# Patient Record
Sex: FEMALE | Race: WHITE | NOT HISPANIC OR LATINO
[De-identification: names, ages, dates, MRNs, and addresses within clinical notes are randomized per-mention and may not be internally consistent; named-entity substitution may affect disease eponyms.]

---

## 2018-01-03 ENCOUNTER — APPOINTMENT (OUTPATIENT)
Dept: THORACIC SURGERY | Facility: HOSPITAL | Age: 83
End: 2018-01-03
Payer: COMMERCIAL

## 2018-01-03 PROCEDURE — 33228 REMV&REPLC PM GEN DUAL LEAD: CPT

## 2018-08-23 ENCOUNTER — APPOINTMENT (OUTPATIENT)
Dept: THORACIC SURGERY | Facility: HOSPITAL | Age: 83
End: 2018-08-23
Payer: MEDICARE

## 2018-08-23 PROCEDURE — 33207 INSERT HEART PM VENTRICULAR: CPT

## 2018-12-22 ENCOUNTER — RECORD ABSTRACTING (OUTPATIENT)
Age: 83
End: 2018-12-22

## 2018-12-22 DIAGNOSIS — G43.109 MIGRAINE WITH AURA, NOT INTRACTABLE, W/OUT STATUS MIGRAINOSUS: ICD-10-CM

## 2018-12-22 DIAGNOSIS — Z78.9 OTHER SPECIFIED HEALTH STATUS: ICD-10-CM

## 2018-12-22 DIAGNOSIS — H53.9 UNSPECIFIED VISUAL DISTURBANCE: ICD-10-CM

## 2018-12-22 DIAGNOSIS — Z82.49 FAMILY HISTORY OF ISCHEMIC HEART DISEASE AND OTHER DISEASES OF THE CIRCULATORY SYSTEM: ICD-10-CM

## 2019-01-17 ENCOUNTER — APPOINTMENT (OUTPATIENT)
Dept: CARDIOLOGY | Facility: CLINIC | Age: 84
End: 2019-01-17

## 2019-02-04 ENCOUNTER — APPOINTMENT (OUTPATIENT)
Dept: CARDIOLOGY | Facility: CLINIC | Age: 84
End: 2019-02-04
Payer: COMMERCIAL

## 2019-02-04 ENCOUNTER — NON-APPOINTMENT (OUTPATIENT)
Age: 84
End: 2019-02-04

## 2019-02-04 VITALS
HEART RATE: 71 BPM | SYSTOLIC BLOOD PRESSURE: 110 MMHG | BODY MASS INDEX: 18.43 KG/M2 | HEIGHT: 63 IN | WEIGHT: 104 LBS | DIASTOLIC BLOOD PRESSURE: 64 MMHG

## 2019-02-04 PROCEDURE — 93288 INTERROG EVL PM/LDLS PM IP: CPT

## 2019-02-04 PROCEDURE — 99214 OFFICE O/P EST MOD 30 MIN: CPT | Mod: 25

## 2019-02-04 PROCEDURE — 93000 ELECTROCARDIOGRAM COMPLETE: CPT | Mod: 59

## 2019-02-04 RX ORDER — LEVOFLOXACIN 750 MG/1
750 TABLET, FILM COATED ORAL
Refills: 0 | Status: DISCONTINUED | COMMUNITY
End: 2019-02-04

## 2019-02-04 NOTE — DISCUSSION/SUMMARY
[FreeTextEntry1] : The patient has had no acute cardiac symptoms. She denies her symptoms of chest pain or shortness of breath. She works out tentatively with the aid of a walker. Today's examination reveals that the patient appears quite well and soon. Indeed there has been a weight loss of about 8 pounds over the past 6 months. The patient states that her appetite is good however. Her teeth good.He had been tested the pacemaker appears to be normal. However the baseline EKG is very difficult to interpret due to baseline artifact. I am suspicious that the patient might have underlying atrial fibrillation at the present time. This will not affect the patient's treatment as I do not anticipate him for anticoagulation because of the patient's age and fragility. However we do plan to bring the patient back for pacemaker interrogation which was immediately diagnosed the underlying rhythm. The current medication including aspirin 81 mg will be continued.

## 2019-02-04 NOTE — PHYSICAL EXAM
[Normal Conjunctiva] : the conjunctiva exhibited no abnormalities [Eyelids - No Xanthelasma] : the eyelids demonstrated no xanthelasmas [Normal Oral Mucosa] : normal oral mucosa [No Oral Pallor] : no oral pallor [No Oral Cyanosis] : no oral cyanosis [Normal Jugular Venous A Waves Present] : normal jugular venous A waves present [Normal Jugular Venous V Waves Present] : normal jugular venous V waves present [No Jugular Venous Willis A Waves] : no jugular venous willis A waves [Respiration, Rhythm And Depth] : normal respiratory rhythm and effort [Exaggerated Use Of Accessory Muscles For Inspiration] : no accessory muscle use [Auscultation Breath Sounds / Voice Sounds] : lungs were clear to auscultation bilaterally [Abdomen Soft] : soft [Abdomen Tenderness] : non-tender [Abdomen Mass (___ Cm)] : no abdominal mass palpated [Abnormal Walk] : normal gait [Gait - Sufficient For Exercise Testing] : the gait was sufficient for exercise testing [Nail Clubbing] : no clubbing of the fingernails [Cyanosis, Localized] : no localized cyanosis [Petechial Hemorrhages (___cm)] : no petechial hemorrhages [Skin Color & Pigmentation] : normal skin color and pigmentation [] : no rash [No Venous Stasis] : no venous stasis [Skin Lesions] : no skin lesions [No Skin Ulcers] : no skin ulcer [No Xanthoma] : no  xanthoma was observed [Oriented To Time, Place, And Person] : oriented to person, place, and time [Affect] : the affect was normal [Mood] : the mood was normal [No Anxiety] : not feeling anxious [FreeTextEntry1] : PM IN PLACE

## 2019-02-04 NOTE — REASON FOR VISIT
[FreeTextEntry1] : Patient is followed with the principal diagnosis of sick sinus syndrome status post permanent pacemaker implantation.

## 2019-02-07 ENCOUNTER — APPOINTMENT (OUTPATIENT)
Dept: CARDIOLOGY | Facility: CLINIC | Age: 84
End: 2019-02-07

## 2019-02-21 ENCOUNTER — APPOINTMENT (OUTPATIENT)
Dept: CARDIOLOGY | Facility: CLINIC | Age: 84
End: 2019-02-21
Payer: COMMERCIAL

## 2019-02-21 ENCOUNTER — APPOINTMENT (OUTPATIENT)
Dept: CARDIOLOGY | Facility: CLINIC | Age: 84
End: 2019-02-21

## 2019-02-21 PROCEDURE — 93288 INTERROG EVL PM/LDLS PM IP: CPT

## 2019-02-21 NOTE — REASON FOR VISIT
[Follow-up Device Check] : follow-up device check visit [FreeTextEntry1] : Patient is followed with the principal diagnosis of sick sinus syndrome status post permanent pacemaker implantation.

## 2019-02-21 NOTE — DISCUSSION/SUMMARY
[Pacemaker Function Normal] : normal pacemaker function [FreeTextEntry1] : Normal device function, pacemaker dependent.  Battery life > 5 years.  Mode switching noted, also Afib may be underdiagnosed due to rate.  Possible underlying chronic  Afib.  Per Dr Elliott's note 2/11/19 anticoagulation deferred due to age and fragility. Continue ASA.

## 2019-02-21 NOTE — HISTORY OF PRESENT ILLNESS
[None] : The patient complains of no symptoms [de-identified] : [employer] dual chamber PPM implanted 8/22/18 Dr DR Trevino after previous device failure new RV lead also  at that time. [employer] Accolade MRI \par  \par

## 2019-08-13 ENCOUNTER — APPOINTMENT (OUTPATIENT)
Dept: CARDIOLOGY | Facility: CLINIC | Age: 84
End: 2019-08-13

## 2019-08-15 ENCOUNTER — APPOINTMENT (OUTPATIENT)
Dept: CARDIOLOGY | Facility: CLINIC | Age: 84
End: 2019-08-15

## 2019-08-22 ENCOUNTER — NON-APPOINTMENT (OUTPATIENT)
Age: 84
End: 2019-08-22

## 2019-08-22 ENCOUNTER — APPOINTMENT (OUTPATIENT)
Dept: CARDIOLOGY | Facility: CLINIC | Age: 84
End: 2019-08-22
Payer: MEDICARE

## 2019-08-22 VITALS
HEIGHT: 63 IN | HEART RATE: 80 BPM | BODY MASS INDEX: 18.78 KG/M2 | DIASTOLIC BLOOD PRESSURE: 82 MMHG | WEIGHT: 106 LBS | SYSTOLIC BLOOD PRESSURE: 124 MMHG

## 2019-08-22 DIAGNOSIS — E03.9 HYPOTHYROIDISM, UNSPECIFIED: ICD-10-CM

## 2019-08-22 PROCEDURE — 93288 INTERROG EVL PM/LDLS PM IP: CPT

## 2019-08-22 PROCEDURE — 99213 OFFICE O/P EST LOW 20 MIN: CPT

## 2019-08-22 PROCEDURE — 93000 ELECTROCARDIOGRAM COMPLETE: CPT | Mod: 59

## 2019-08-22 RX ORDER — MULTIVITAMIN
CAPSULE ORAL
Refills: 0 | Status: DISCONTINUED | COMMUNITY
End: 2019-08-22

## 2019-08-22 RX ORDER — HYPROMELLOSE 0.3 %
GEL (ML) OPHTHALMIC (EYE)
Refills: 0 | Status: DISCONTINUED | COMMUNITY
End: 2019-08-22

## 2019-08-22 RX ORDER — ESTRADIOL 0.1 MG/G
0.1 CREAM VAGINAL
Refills: 0 | Status: DISCONTINUED | COMMUNITY
End: 2019-08-22

## 2019-08-23 NOTE — PHYSICAL EXAM
[General Appearance - Well Developed] : well developed [Normal Appearance] : normal appearance [General Appearance - Well Nourished] : well nourished [Well Groomed] : well groomed [No Deformities] : no deformities [Normal Conjunctiva] : the conjunctiva exhibited no abnormalities [General Appearance - In No Acute Distress] : no acute distress [Normal Oral Mucosa] : normal oral mucosa [Eyelids - No Xanthelasma] : the eyelids demonstrated no xanthelasmas [No Oral Pallor] : no oral pallor [No Oral Cyanosis] : no oral cyanosis [Normal Jugular Venous A Waves Present] : normal jugular venous A waves present [Normal Jugular Venous V Waves Present] : normal jugular venous V waves present [No Jugular Venous Willis A Waves] : no jugular venous willis A waves [Respiration, Rhythm And Depth] : normal respiratory rhythm and effort [Exaggerated Use Of Accessory Muscles For Inspiration] : no accessory muscle use [Auscultation Breath Sounds / Voice Sounds] : lungs were clear to auscultation bilaterally [Heart Sounds] : normal S1 and S2 [Heart Rate And Rhythm] : heart rate and rhythm were normal [Murmurs] : no murmurs present [Abdomen Soft] : soft [Abdomen Tenderness] : non-tender [Abdomen Mass (___ Cm)] : no abdominal mass palpated [Abnormal Walk] : normal gait [Gait - Sufficient For Exercise Testing] : the gait was sufficient for exercise testing [Cyanosis, Localized] : no localized cyanosis [Nail Clubbing] : no clubbing of the fingernails [Petechial Hemorrhages (___cm)] : no petechial hemorrhages [No Venous Stasis] : no venous stasis [Skin Color & Pigmentation] : normal skin color and pigmentation [] : no rash [No Skin Ulcers] : no skin ulcer [Skin Lesions] : no skin lesions [No Xanthoma] : no  xanthoma was observed [Oriented To Time, Place, And Person] : oriented to person, place, and time [Affect] : the affect was normal [Mood] : the mood was normal [No Anxiety] : not feeling anxious [FreeTextEntry1] : pm site ok

## 2019-08-23 NOTE — DISCUSSION/SUMMARY
[FreeTextEntry1] : I am pleased with today's examination the patient offers no acute cardiac symptoms. The cardiorespiratory examination is normal the pacemaker site is good. The blood pressure is 124/82 electrocardiogram sinus rhythm with ventricular pacing tracking. Magnet response normal. Based on today's visit I find the patient's cardiovascular status to be stable no changes in her cardiac regimen are required at this time we will have her back in 3 months for a formal pacemaker interrogation with [employer].

## 2019-08-23 NOTE — REASON FOR VISIT
[FreeTextEntry1] : Patient is followed with the principal diagnosis of sick sinus syndrome status post pacemaker insertion. She currently has a [employer] dual chamber pacemaker.

## 2019-08-23 NOTE — HISTORY OF PRESENT ILLNESS
[FreeTextEntry1] : The patient is doing extremely well clinically. She describes no acute cardiac symptoms. She remains ambulatory and active. There have been no symptoms of acute chest pain shortness of breath or palpitations.

## 2019-11-14 ENCOUNTER — APPOINTMENT (OUTPATIENT)
Dept: CARDIOLOGY | Facility: CLINIC | Age: 84
End: 2019-11-14

## 2020-02-27 ENCOUNTER — APPOINTMENT (OUTPATIENT)
Dept: CARDIOLOGY | Facility: CLINIC | Age: 85
End: 2020-02-27
Payer: MEDICARE

## 2020-02-27 ENCOUNTER — NON-APPOINTMENT (OUTPATIENT)
Age: 85
End: 2020-02-27

## 2020-02-27 VITALS
HEART RATE: 84 BPM | HEIGHT: 63 IN | BODY MASS INDEX: 19.84 KG/M2 | SYSTOLIC BLOOD PRESSURE: 112 MMHG | DIASTOLIC BLOOD PRESSURE: 80 MMHG | WEIGHT: 112 LBS

## 2020-02-27 DIAGNOSIS — Z00.00 ENCOUNTER FOR GENERAL ADULT MEDICAL EXAMINATION W/OUT ABNORMAL FINDINGS: ICD-10-CM

## 2020-02-27 PROCEDURE — 93288 INTERROG EVL PM/LDLS PM IP: CPT

## 2020-02-27 PROCEDURE — 93000 ELECTROCARDIOGRAM COMPLETE: CPT | Mod: 59

## 2020-02-27 PROCEDURE — 99214 OFFICE O/P EST MOD 30 MIN: CPT

## 2020-02-27 RX ORDER — FAMOTIDINE 20 MG/1
20 TABLET, FILM COATED ORAL
Refills: 0 | Status: DISCONTINUED | COMMUNITY
End: 2020-02-27

## 2020-02-27 RX ORDER — CEPHALEXIN 250 MG/1
250 CAPSULE ORAL TWICE DAILY
Refills: 0 | Status: DISCONTINUED | COMMUNITY
End: 2020-02-27

## 2020-02-27 NOTE — HISTORY OF PRESENT ILLNESS
[FreeTextEntry1] : Patient is doing very well. She goes out of her house for an outing every day with walking activity. No symptoms of acute chest pain shortness of breath or palpitations.

## 2020-02-27 NOTE — PHYSICAL EXAM
[General Appearance - Well Developed] : well developed [Normal Appearance] : normal appearance [Well Groomed] : well groomed [General Appearance - Well Nourished] : well nourished [General Appearance - In No Acute Distress] : no acute distress [No Deformities] : no deformities [Normal Conjunctiva] : the conjunctiva exhibited no abnormalities [Normal Oral Mucosa] : normal oral mucosa [Eyelids - No Xanthelasma] : the eyelids demonstrated no xanthelasmas [No Oral Pallor] : no oral pallor [No Oral Cyanosis] : no oral cyanosis [Normal Jugular Venous V Waves Present] : normal jugular venous V waves present [No Jugular Venous Willis A Waves] : no jugular venous willis A waves [Normal Jugular Venous A Waves Present] : normal jugular venous A waves present [Respiration, Rhythm And Depth] : normal respiratory rhythm and effort [Auscultation Breath Sounds / Voice Sounds] : lungs were clear to auscultation bilaterally [Exaggerated Use Of Accessory Muscles For Inspiration] : no accessory muscle use [Heart Sounds] : normal S1 and S2 [Heart Rate And Rhythm] : heart rate and rhythm were normal [Murmurs] : no murmurs present [Abdomen Tenderness] : non-tender [Abdomen Soft] : soft [Abdomen Mass (___ Cm)] : no abdominal mass palpated [Gait - Sufficient For Exercise Testing] : the gait was sufficient for exercise testing [Abnormal Walk] : normal gait [Skin Color & Pigmentation] : normal skin color and pigmentation [] : no rash [Skin Lesions] : no skin lesions [No Skin Ulcers] : no skin ulcer [No Venous Stasis] : no venous stasis [Mood] : the mood was normal [Affect] : the affect was normal [No Xanthoma] : no  xanthoma was observed [Oriented To Time, Place, And Person] : oriented to person, place, and time [No Anxiety] : not feeling anxious [FreeTextEntry1] : 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

## 2020-02-27 NOTE — DISCUSSION/SUMMARY
[FreeTextEntry1] : The patient's clinical condition is stable. A magnet test of the pacemaker today reveals satisfactory pacemaker function. The patient's examination reveals no evidence of fluid retention with CHF. Very pleased with the patient's progress. In reviewing her medications I recommend the following changes aspirin can be discontinued. Furosemide 20 mg daily does not appear to be necessary at this time as the patient has had no significant fluid retention. I believe this can be changed to an as needed medication. We will have the patient return for a formal pacemaker interrogation with [email or web address] electrocardiogram reveals sinus rhythm with ventricular pacing with  AV tracking.  A magnet test of the pacemaker reveals a normal [employer] magnet response.

## 2020-02-27 NOTE — REASON FOR VISIT
[FreeTextEntry1] : Patient is followed with the principal diagnosis of sick sinus syndrome status post pacemaker implantation. She had a battery change in 2018 with [employer] device implanted.

## 2020-06-18 ENCOUNTER — APPOINTMENT (OUTPATIENT)
Dept: CARDIOLOGY | Facility: CLINIC | Age: 85
End: 2020-06-18
Payer: MEDICARE

## 2020-06-18 ENCOUNTER — NON-APPOINTMENT (OUTPATIENT)
Age: 85
End: 2020-06-18

## 2020-06-18 VITALS
HEART RATE: 87 BPM | SYSTOLIC BLOOD PRESSURE: 100 MMHG | WEIGHT: 105 LBS | BODY MASS INDEX: 14.22 KG/M2 | HEIGHT: 72 IN | DIASTOLIC BLOOD PRESSURE: 68 MMHG

## 2020-06-18 DIAGNOSIS — Z87.898 PERSONAL HISTORY OF OTHER SPECIFIED CONDITIONS: ICD-10-CM

## 2020-06-18 DIAGNOSIS — Z86.39 PERSONAL HISTORY OF OTHER ENDOCRINE, NUTRITIONAL AND METABOLIC DISEASE: ICD-10-CM

## 2020-06-18 PROCEDURE — 99213 OFFICE O/P EST LOW 20 MIN: CPT

## 2020-06-18 PROCEDURE — 93280 PM DEVICE PROGR EVAL DUAL: CPT

## 2020-06-18 PROCEDURE — 93000 ELECTROCARDIOGRAM COMPLETE: CPT | Mod: 59

## 2020-06-18 NOTE — DISCUSSION/SUMMARY
[FreeTextEntry1] : The patient's cardiac examination is stable. The cardiac examination reveals a low-grade systolic murmur at the left border the electrocardiograph reveals sinus rhythm and ventricular pacing with AV tracking. An APD is noted. The pacemaker was interrogated comprehensively today there was a adjustment downward and the atrial voltage. The pacemaker function is satisfactory. No further changes and cardiac medications are required.\par Patient's hearing is very impaired today. Also she appears to be somewhat confused. I notified the nursing staff and Nahomy  of this finding and apparently this has been a chronic development over the past several months.

## 2020-06-18 NOTE — REASON FOR VISIT
[FreeTextEntry1] : Patient is followed with a diagnosis of sick sinus syndrome status post pacemaker implantation. Patient's cardiovascular status has been stable.

## 2020-06-18 NOTE — PHYSICAL EXAM
[General Appearance - Well Developed] : well developed [Normal Appearance] : normal appearance [Well Groomed] : well groomed [General Appearance - Well Nourished] : well nourished [No Deformities] : no deformities [General Appearance - In No Acute Distress] : no acute distress [Normal Conjunctiva] : the conjunctiva exhibited no abnormalities [Eyelids - No Xanthelasma] : the eyelids demonstrated no xanthelasmas [Normal Oral Mucosa] : normal oral mucosa [No Oral Pallor] : no oral pallor [No Oral Cyanosis] : no oral cyanosis [Normal Jugular Venous A Waves Present] : normal jugular venous A waves present [Normal Jugular Venous V Waves Present] : normal jugular venous V waves present [No Jugular Venous Willis A Waves] : no jugular venous willis A waves [Respiration, Rhythm And Depth] : normal respiratory rhythm and effort [Exaggerated Use Of Accessory Muscles For Inspiration] : no accessory muscle use [Auscultation Breath Sounds / Voice Sounds] : lungs were clear to auscultation bilaterally [Heart Sounds] : normal S1 and S2 [Heart Rate And Rhythm] : heart rate and rhythm were normal [Murmurs] : no murmurs present [Abdomen Soft] : soft [Abdomen Tenderness] : non-tender [Abdomen Mass (___ Cm)] : no abdominal mass palpated [Nail Clubbing] : no clubbing of the fingernails [Gait - Sufficient For Exercise Testing] : the gait was sufficient for exercise testing [Abnormal Walk] : normal gait [Cyanosis, Localized] : no localized cyanosis [Petechial Hemorrhages (___cm)] : no petechial hemorrhages [Skin Color & Pigmentation] : normal skin color and pigmentation [] : no rash [No Venous Stasis] : no venous stasis [No Skin Ulcers] : no skin ulcer [Skin Lesions] : no skin lesions [No Xanthoma] : no  xanthoma was observed [Oriented To Time, Place, And Person] : oriented to person, place, and time [Affect] : the affect was normal [No Anxiety] : not feeling anxious [Mood] : the mood was normal [FreeTextEntry1] : systolic murmur aortic area.

## 2020-06-18 NOTE — HISTORY OF PRESENT ILLNESS
[FreeTextEntry1] : She has had no acute cardiac symptoms. There have been no symptoms of significant shortness of breath palpitations chest pain dizziness or syncope. However the patient does appear quite confused today.

## 2020-12-17 ENCOUNTER — APPOINTMENT (OUTPATIENT)
Dept: CARDIOLOGY | Facility: CLINIC | Age: 85
End: 2020-12-17

## 2021-11-18 ENCOUNTER — APPOINTMENT (OUTPATIENT)
Dept: CARDIOLOGY | Facility: CLINIC | Age: 86
End: 2021-11-18
Payer: MEDICARE

## 2021-11-18 ENCOUNTER — APPOINTMENT (OUTPATIENT)
Dept: CARDIOLOGY | Facility: CLINIC | Age: 86
End: 2021-11-18

## 2021-11-18 VITALS
OXYGEN SATURATION: 95 % | BODY MASS INDEX: 19.84 KG/M2 | DIASTOLIC BLOOD PRESSURE: 60 MMHG | SYSTOLIC BLOOD PRESSURE: 102 MMHG | WEIGHT: 112 LBS | HEIGHT: 63 IN | HEART RATE: 82 BPM

## 2021-11-18 PROCEDURE — 93280 PM DEVICE PROGR EVAL DUAL: CPT

## 2021-11-18 RX ORDER — LISINOPRIL 10 MG/1
10 TABLET ORAL
Qty: 90 | Refills: 3 | Status: ACTIVE | COMMUNITY
Start: 2021-11-18

## 2021-11-18 RX ORDER — LEVOTHYROXINE SODIUM 0.05 MG/1
50 TABLET ORAL DAILY
Refills: 0 | Status: ACTIVE | COMMUNITY

## 2021-11-18 RX ORDER — ACETAMINOPHEN 325 MG/1
325 TABLET, FILM COATED ORAL
Refills: 0 | Status: ACTIVE | COMMUNITY

## 2021-11-18 RX ORDER — FUROSEMIDE 20 MG/1
20 TABLET ORAL DAILY
Refills: 0 | Status: COMPLETED | COMMUNITY
End: 2021-11-18

## 2021-11-18 NOTE — DISCUSSION/SUMMARY
[Pacemaker Function Normal] : normal pacemaker function [Patient] : the patient [de-identified] : Resume aspirin 81mg daily. Follow up with Dr. Elliott and device analysis in March as scheduled

## 2021-11-18 NOTE — PHYSICAL EXAM
[Normal Appearance] : normal appearance [Well Groomed] : well groomed [General Appearance - In No Acute Distress] : no acute distress [Irregularly Irregular] : the rhythm was irregularly irregular [] : no respiratory distress [Respiration, Rhythm And Depth] : normal respiratory rhythm and effort [Auscultation Breath Sounds / Voice Sounds] : lungs were clear to auscultation bilaterally

## 2021-11-18 NOTE — REVIEW OF SYSTEMS
[Memory Lapses Or Loss] : memory lapses or loss [SOB] : no shortness of breath [Chest Discomfort] : no chest discomfort [Palpitations] : no palpitations [Syncope] : no syncope [Dizziness] : no dizziness

## 2021-11-18 NOTE — PROCEDURE
[Pacemaker] : pacemaker [[employer]] : [facility] [No] : not [Atrial Fibrillation] : atrial fibrillation [DDDR] : DDDR [Threshold Testing Performed] : Threshold testing was performed [None] : none [Counters Reset] : the counters were reset [de-identified] : Accolade MRi EL L331 [de-identified] : [id number] [de-identified] : 8/23/2018 [de-identified] : 60/120 [de-identified] : 6.5 years [de-identified] : Normal device function. 11 ATR mode switches

## 2021-11-18 NOTE — REASON FOR VISIT
[Follow-up Device Check] : is here today for a follow-up device check visit for [Other ___] : [unfilled] [Formal Caregiver] : formal caregiver

## 2021-11-18 NOTE — HISTORY OF PRESENT ILLNESS
[de-identified] : Alexia Anguiano presents for device analysis. She reports feeling well. She denies chest pain, SOB, palpitations, dizziness or syncope.\par \par 89 year old female with sick sinus syndrome s/p placement of [employer] dual chamber pacemaker on 8/23/2018 by Dr. Trevino, history of hypothyroidism, hyperlipidemia,  hypertension.

## 2022-03-03 ENCOUNTER — APPOINTMENT (OUTPATIENT)
Dept: CARDIOLOGY | Facility: CLINIC | Age: 87
End: 2022-03-03
Payer: MEDICARE

## 2022-03-03 ENCOUNTER — NON-APPOINTMENT (OUTPATIENT)
Age: 87
End: 2022-03-03

## 2022-03-03 VITALS
HEIGHT: 63 IN | DIASTOLIC BLOOD PRESSURE: 60 MMHG | OXYGEN SATURATION: 96 % | HEART RATE: 78 BPM | SYSTOLIC BLOOD PRESSURE: 100 MMHG | TEMPERATURE: 97.6 F | BODY MASS INDEX: 20.73 KG/M2 | WEIGHT: 117 LBS

## 2022-03-03 PROCEDURE — 93000 ELECTROCARDIOGRAM COMPLETE: CPT | Mod: 59

## 2022-03-03 PROCEDURE — 93288 INTERROG EVL PM/LDLS PM IP: CPT

## 2022-03-03 PROCEDURE — 99212 OFFICE O/P EST SF 10 MIN: CPT

## 2022-09-15 ENCOUNTER — APPOINTMENT (OUTPATIENT)
Dept: CARDIOLOGY | Facility: CLINIC | Age: 87
End: 2022-09-15

## 2022-09-15 ENCOUNTER — NON-APPOINTMENT (OUTPATIENT)
Age: 87
End: 2022-09-15

## 2022-09-15 VITALS — SYSTOLIC BLOOD PRESSURE: 120 MMHG | HEART RATE: 83 BPM | OXYGEN SATURATION: 94 % | DIASTOLIC BLOOD PRESSURE: 63 MMHG

## 2022-09-15 DIAGNOSIS — I10 ESSENTIAL (PRIMARY) HYPERTENSION: ICD-10-CM

## 2022-09-15 PROCEDURE — 99213 OFFICE O/P EST LOW 20 MIN: CPT

## 2022-09-15 PROCEDURE — 93280 PM DEVICE PROGR EVAL DUAL: CPT

## 2022-09-15 PROCEDURE — 93000 ELECTROCARDIOGRAM COMPLETE: CPT | Mod: 59

## 2022-09-15 RX ORDER — AMOXICILLIN 500 MG/1
500 CAPSULE ORAL
Qty: 12 | Refills: 1 | Status: DISCONTINUED | COMMUNITY
Start: 2021-11-18 | End: 2022-09-15

## 2022-09-15 NOTE — PHYSICAL EXAM
[Well Developed] : well developed [Well Nourished] : well nourished [No Acute Distress] : no acute distress [Normal Conjunctiva] : normal conjunctiva [Normal Venous Pressure] : normal venous pressure [No Carotid Bruit] : no carotid bruit [Normal S1, S2] : normal S1, S2 [No Murmur] : no murmur [No Rub] : no rub [No Gallop] : no gallop [Clear Lung Fields] : clear lung fields [Good Air Entry] : good air entry [No Respiratory Distress] : no respiratory distress  [Soft] : abdomen soft [Non Tender] : non-tender [No Masses/organomegaly] : no masses/organomegaly [Normal Bowel Sounds] : normal bowel sounds [Normal Gait] : normal gait [No Edema] : no edema [No Cyanosis] : no cyanosis [No Clubbing] : no clubbing [No Varicosities] : no varicosities [No Rash] : no rash [No Skin Lesions] : no skin lesions [Moves all extremities] : moves all extremities [No Focal Deficits] : no focal deficits [Normal Speech] : normal speech [Alert and Oriented] : alert and oriented [Normal memory] : normal memory [de-identified] : Some degree of dementia is evident.

## 2022-09-15 NOTE — DISCUSSION/SUMMARY
[FreeTextEntry1] : Patient's examination today and clinical condition appeared to be relatively stable.  There have been no acute cardiac symptoms.  Activity and mobility are limited.  The patient is mostly confined to a wheelchair\par \par The vital signs are stable the cardiorespiratory examination is unremarkable the baseline electrocardiogram reveals sinus rhythm with ventricular pacing with AV tracking\par \par And interrogation of the pacemaker performed today reveals satisfactory pacemaker function there is evidence of paroxysmal atrial fibrillation.\par \par Because of the patient's advanced age and infirmity she is not a candidate for full anticoagulation which would be prohibitively risky.  The current medications will be continued.\par \par The records from [facility] were reviewed.  I am electing to make no changes in the patient's medication at the present time.\par \par

## 2022-09-15 NOTE — REASON FOR VISIT
[FreeTextEntry1] : Patient returns today for follow-up including pacemaker interrogation.  The patient is followed with a diagnosis of sick sinus syndrome, heart block, status post permanent pacemaker ([employer]) the patient who is now 89 years old resides at [facility].  Her condition has been stable.  There have been no symptoms of acute chest pain shortness of breath or palpitations.  Activity is very limited the patient is mostly confined to a wheelchair.

## 2022-09-15 NOTE — REASON FOR VISIT
[FreeTextEntry1] : Patient returns for follow-up including pacemaker interrogation.  She is followed with the principal diagnosis of sick sinus syndrome, atrial fibrillation, history of hypertension, and pacemaker.  He has a [employer] pacemaker device.

## 2022-09-15 NOTE — REVIEW OF SYSTEMS
[Hearing Loss] : hearing loss [Negative] : Heme/Lymph [FreeTextEntry2] : Elderly female in a wheelchair in no acute distress.

## 2022-09-15 NOTE — DISCUSSION/SUMMARY
[FreeTextEntry1] : The patient's overall clinical condition is stable.  Patient does ambulate with a walker.  She has had no acute cardiac symptoms.  She has had no symptoms of acute chest pain shortness of breath or palpitations.  The cardiorespiratory examination is satisfactory.  The blood pressure today was 100/60 the electrocardiography reveals sinus rhythm and also dual-chamber pacing with utricular pacing with AV tracking.\par The patient's rhythm according to the pacemaker interrogation is predominantly sinus with episodes of atrial fibrillation.  The patient will continue on baby aspirin anticoagulation as we believe that full anticoagulation is prohibitively hazardous in this case due to the patient's age and frail condition.  No further cardiac testing is required at the present time.  We will continue to follow the patient's progress periodically.

## 2022-09-16 ENCOUNTER — NON-APPOINTMENT (OUTPATIENT)
Age: 87
End: 2022-09-16

## 2023-04-06 ENCOUNTER — APPOINTMENT (OUTPATIENT)
Dept: CARDIOLOGY | Facility: CLINIC | Age: 88
End: 2023-04-06
Payer: SELF-PAY

## 2023-04-06 ENCOUNTER — APPOINTMENT (OUTPATIENT)
Dept: CARDIOLOGY | Facility: CLINIC | Age: 88
End: 2023-04-06

## 2023-04-06 VITALS
HEIGHT: 63 IN | HEART RATE: 80 BPM | OXYGEN SATURATION: 96 % | DIASTOLIC BLOOD PRESSURE: 70 MMHG | WEIGHT: 106 LBS | BODY MASS INDEX: 18.78 KG/M2 | SYSTOLIC BLOOD PRESSURE: 110 MMHG

## 2023-04-06 PROCEDURE — 93280 PM DEVICE PROGR EVAL DUAL: CPT

## 2023-04-06 RX ORDER — IBUPROFEN 200 MG/1
200 TABLET ORAL TWICE DAILY
Refills: 0 | Status: COMPLETED | COMMUNITY
Start: 2021-11-18 | End: 2023-04-06

## 2023-04-06 RX ORDER — SENNOSIDES 8.6 MG
8.6 TABLET ORAL
Refills: 0 | Status: ACTIVE | COMMUNITY

## 2023-04-06 NOTE — HISTORY OF PRESENT ILLNESS
[de-identified] : 89 year old female with sick sinus syndrome s/p placement of [employer] dual chamber pacemaker on 8/23/2018 by Dr. Trevino, history of hypothyroidism, hyperlipidemia,  hypertension.

## 2023-04-06 NOTE — PHYSICAL EXAM
[Normal Appearance] : normal appearance [Well Groomed] : well groomed [General Appearance - In No Acute Distress] : no acute distress [Irregularly Irregular] : the rhythm was irregularly irregular [] : no respiratory distress [Respiration, Rhythm And Depth] : normal respiratory rhythm and effort

## 2023-04-06 NOTE — PROCEDURE
[No] : not [Atrial Fibrillation] : atrial fibrillation [Pacemaker] : pacemaker [[employer]] : [facility] [DDDR] : DDDR [Threshold Testing Performed] : Threshold testing was performed [None] : none [Counters Reset] : the counters were reset [de-identified] : Accolade MRi EL L331 [de-identified] : [id number] [de-identified] : 8/23/2018 [de-identified] : 60/120 [de-identified] : 6.5 years [de-identified] : Normal device function. 10 ATR mode switches. AT/AF burden 2%

## 2023-04-06 NOTE — DISCUSSION/SUMMARY
[Pacemaker Function Normal] : normal pacemaker function [Patient] : the patient [de-identified] : Device interrogation in 6 months

## 2023-05-30 ENCOUNTER — APPOINTMENT (OUTPATIENT)
Dept: CARDIOLOGY | Facility: CLINIC | Age: 88
End: 2023-05-30
Payer: MEDICARE

## 2023-05-30 ENCOUNTER — NON-APPOINTMENT (OUTPATIENT)
Age: 88
End: 2023-05-30

## 2023-05-30 VITALS — DIASTOLIC BLOOD PRESSURE: 64 MMHG | SYSTOLIC BLOOD PRESSURE: 108 MMHG | HEIGHT: 63 IN

## 2023-05-30 DIAGNOSIS — I48.20 CHRONIC ATRIAL FIBRILLATION, UNSP: ICD-10-CM

## 2023-05-30 DIAGNOSIS — R41.89 OTHER SYMPTOMS AND SIGNS INVOLVING COGNITIVE FUNCTIONS AND AWARENESS: ICD-10-CM

## 2023-05-30 DIAGNOSIS — I49.5 SICK SINUS SYNDROME: ICD-10-CM

## 2023-05-30 PROCEDURE — 99213 OFFICE O/P EST LOW 20 MIN: CPT

## 2023-05-30 PROCEDURE — 93000 ELECTROCARDIOGRAM COMPLETE: CPT

## 2023-05-30 RX ORDER — ASPIRIN 81 MG/1
81 TABLET, COATED ORAL
Qty: 30 | Refills: 0 | Status: ACTIVE | COMMUNITY
Start: 2022-08-21

## 2023-05-30 NOTE — REVIEW OF SYSTEMS
[Negative] : Heme/Lymph [de-identified] : The patient is much less animated.  Listless.  No spontaneous verbal communication.  Significant cognitive decline over the past several visits.

## 2023-05-30 NOTE — PHYSICAL EXAM

## 2023-05-30 NOTE — DISCUSSION/SUMMARY
[FreeTextEntry1] : The patient has declined very substantially over the past several visits.  She is much less animated, silent, no spontaneous verbal communication.  The patient does respond to questions with simple answers.\par \par The respiratory status appears stable\par \par The lungs are clear the heart sounds are normal there is no significant edema.\par Electrocardiogram reveals sinus rhythm with ventricular pacing and also dual-chamber pacing.\par \par Pacemaker was interrogated on April 6.  The pacemaker battery life is estimated at 5 years.  The patient is pacemaker dependent.\par \par Based on today's examination and evaluation there is significant neurocognitive decline.  However the cardiovascular status is stable.\par \par No changes in the patient's medications are indicated at this time\par \par We will have the patient back in 6 months for pacemaker interrogation.\par \par

## 2023-05-30 NOTE — REASON FOR VISIT
[FreeTextEntry1] : Patient returns for follow-up today\par \par I have followed her for a number of years with a diagnosis of sick sinus syndrome, paroxysmal atrial fibrillation, pacemaker.\par \par The patient has had no acute clinical symptoms.  However during the past several visits she has declined a very significantly.  She is extremely listless.  There are no spontaneous verbal communications.  The patient is quite hypokinetic.  She is confined to a wheelchair.  She does need assistance in daily activities including eating.  I am told by her nurses aide that she her appetite is adequate.\par \par There is no report of acute chest pain or shortness of breath.

## 2023-11-02 ENCOUNTER — APPOINTMENT (OUTPATIENT)
Dept: CARDIOLOGY | Facility: CLINIC | Age: 88
End: 2023-11-02
Payer: MEDICARE

## 2023-11-02 VITALS — SYSTOLIC BLOOD PRESSURE: 110 MMHG | DIASTOLIC BLOOD PRESSURE: 66 MMHG | HEART RATE: 74 BPM | OXYGEN SATURATION: 95 %

## 2023-11-02 DIAGNOSIS — Z95.0 PRESENCE OF CARDIAC PACEMAKER: ICD-10-CM

## 2023-11-02 PROCEDURE — 93280 PM DEVICE PROGR EVAL DUAL: CPT

## 2023-11-02 RX ORDER — COVID-19 ANTIGEN TEST
KIT MISCELLANEOUS
Qty: 8 | Refills: 0 | Status: COMPLETED | COMMUNITY
Start: 2023-03-22 | End: 2023-11-02

## 2023-11-02 RX ORDER — CLINDAMYCIN HYDROCHLORIDE 300 MG/1
300 CAPSULE ORAL
Qty: 2 | Refills: 0 | Status: COMPLETED | COMMUNITY
Start: 2023-03-27 | End: 2023-11-02

## 2023-11-02 RX ORDER — ASPIRIN 81 MG
81 TABLET, DELAYED RELEASE (ENTERIC COATED) ORAL
Refills: 0 | Status: COMPLETED | COMMUNITY
End: 2023-11-02

## 2024-07-04 ENCOUNTER — NON-APPOINTMENT (OUTPATIENT)
Age: 89
End: 2024-07-04

## 2024-07-04 DIAGNOSIS — E03.9 HYPOTHYROIDISM, UNSPECIFIED: ICD-10-CM

## 2024-07-05 ENCOUNTER — APPOINTMENT (OUTPATIENT)
Dept: CARDIOLOGY | Facility: CLINIC | Age: 89
End: 2024-07-05
Payer: MEDICARE

## 2024-07-05 ENCOUNTER — NON-APPOINTMENT (OUTPATIENT)
Age: 89
End: 2024-07-05

## 2024-07-05 VITALS
HEART RATE: 65 BPM | SYSTOLIC BLOOD PRESSURE: 100 MMHG | DIASTOLIC BLOOD PRESSURE: 60 MMHG | OXYGEN SATURATION: 99 % | HEIGHT: 63 IN

## 2024-07-05 DIAGNOSIS — I10 ESSENTIAL (PRIMARY) HYPERTENSION: ICD-10-CM

## 2024-07-05 DIAGNOSIS — Z95.0 PRESENCE OF CARDIAC PACEMAKER: ICD-10-CM

## 2024-07-05 DIAGNOSIS — I48.20 CHRONIC ATRIAL FIBRILLATION, UNSP: ICD-10-CM

## 2024-07-05 DIAGNOSIS — I49.5 SICK SINUS SYNDROME: ICD-10-CM

## 2024-07-05 DIAGNOSIS — Z86.39 PERSONAL HISTORY OF OTHER ENDOCRINE, NUTRITIONAL AND METABOLIC DISEASE: ICD-10-CM

## 2024-07-05 DIAGNOSIS — Z87.898 PERSONAL HISTORY OF OTHER SPECIFIED CONDITIONS: ICD-10-CM

## 2024-07-05 PROCEDURE — 93000 ELECTROCARDIOGRAM COMPLETE: CPT

## 2024-07-05 PROCEDURE — 99214 OFFICE O/P EST MOD 30 MIN: CPT

## 2024-07-05 RX ORDER — YOHIMBE BARK 500 MG
CAPSULE ORAL
Refills: 0 | Status: ACTIVE | COMMUNITY

## 2024-07-05 RX ORDER — NUT.TX.IMPAIRED DIGESTIVE FXN 0.035-1/ML
LIQUID (ML) ORAL
Refills: 0 | Status: ACTIVE | COMMUNITY

## 2024-07-05 RX ORDER — SENNOSIDES 8.6 MG TABLETS 8.6 MG/1
8.6 TABLET ORAL
Refills: 0 | Status: ACTIVE | COMMUNITY

## 2024-07-05 RX ORDER — DEXTROMETHORPHAN HBR, GUAIFENESIN 20; 200 MG/10ML; MG/10ML
SOLUTION ORAL
Refills: 0 | Status: ACTIVE | COMMUNITY

## 2024-07-07 PROBLEM — Z87.898 HISTORY OF SYNCOPE: Status: RESOLVED | Noted: 2018-12-22 | Resolved: 2024-07-07

## 2024-07-07 PROBLEM — Z86.39 HISTORY OF HYPERLIPIDEMIA: Status: RESOLVED | Noted: 2018-12-22 | Resolved: 2024-07-07

## 2025-01-02 ENCOUNTER — APPOINTMENT (OUTPATIENT)
Dept: CARDIOLOGY | Facility: CLINIC | Age: 89
End: 2025-01-02